# Patient Record
Sex: MALE | Race: WHITE | NOT HISPANIC OR LATINO | Employment: FULL TIME | URBAN - METROPOLITAN AREA
[De-identification: names, ages, dates, MRNs, and addresses within clinical notes are randomized per-mention and may not be internally consistent; named-entity substitution may affect disease eponyms.]

---

## 2019-11-21 ENCOUNTER — DOCUMENTATION ONLY (OUTPATIENT)
Dept: CARE COORDINATION | Facility: CLINIC | Age: 54
End: 2019-11-21

## 2019-11-21 NOTE — TELEPHONE ENCOUNTER
DIAGNOSIS: Lt Knee Pain, Pt requesting a PRP Injection, No Imaging, Surgery in 2015 or 2016 at [facility], Per Pt   APPOINTMENT DATE: 12.2.19   NOTES STATUS DETAILS   OFFICE NOTE from referring provider N/A    OFFICE NOTE from other specialist Care Everywhere 10.28.19 Dr. Cr Brooks  2.7.19 Dr. Cr Brooks  7.26.18 Dr. Cr Brooks  More in EPIC   DISCHARGE SUMMARY from hospital N/A    DISCHARGE REPORT from the ER N/A    OPERATIVE REPORT In process    MEDICATION LIST Care Everywhere    IMPLANT RECORD/STICKER N/A    LABS     CBC/DIFF Care Everywhere 12.5.08   CULTURES N/A    INJECTIONS DONE IN RADIOLOGY N/A    MRI recieved 7.26.18 [facility]   CT SCAN N/A    XRAYS (IMAGES & REPORTS) recieved 7.26.18 [facility]   TUMOR     PATHOLOGY  Slides & report N/A      11.21.19 MJ 12:11 PM  Request images from [facility]. Requested all op notes, office notes, related imaging and reports from [facility].    12/10/19 RH 3pm  Called [facility] to follow up on records and imaging they stated an JESSE is needed for records  Left vm for patient

## 2019-12-09 ENCOUNTER — HEALTH MAINTENANCE LETTER (OUTPATIENT)
Age: 54
End: 2019-12-09

## 2019-12-11 NOTE — TELEPHONE ENCOUNTER
Called and LVM on home phone, unable to leave one on cell phone due to mailbox not being set up. Let him know  would like to see him before PRP, also informed he would need to prepay $500 before the appointment. Gave him our number if he questions.

## 2019-12-12 ENCOUNTER — OFFICE VISIT (OUTPATIENT)
Dept: ORTHOPEDICS | Facility: CLINIC | Age: 54
End: 2019-12-12
Payer: COMMERCIAL

## 2019-12-12 ENCOUNTER — PRE VISIT (OUTPATIENT)
Dept: ORTHOPEDICS | Facility: CLINIC | Age: 54
End: 2019-12-12

## 2019-12-12 VITALS — HEIGHT: 70 IN | WEIGHT: 191 LBS | RESPIRATION RATE: 16 BRPM | BODY MASS INDEX: 27.35 KG/M2

## 2019-12-12 DIAGNOSIS — M17.12 LOCALIZED OSTEOARTHRITIS OF LEFT KNEE: Primary | ICD-10-CM

## 2019-12-12 ASSESSMENT — MIFFLIN-ST. JEOR: SCORE: 1722.62

## 2019-12-12 NOTE — LETTER
Date:December 13, 2019      Patient was self referred, no letter generated. Do not send.        [facility] Information

## 2019-12-12 NOTE — LETTER
"  12/12/2019      RE: Bridger Corona  [address] [facility] [address]       Sports Medicine Clinic Visit:     Subjective:   Bridger Corona is a 54 year old male who is seen in consultation at the request of  for  left knee pain that started  3 years ago.  He and Dr. Hankins had discussed intraarticular PRP as another possibility for managing the sx of OA.  Viscosupplement injection in the past did not help sx.  He is concerned about repeated corticosteroid injections and the effects on the knee over time.  He is able to continue skating but running is uncomfortable and he has been avoiding running.    Onset:Chronic  Mechanism of Injury: NA  Location of Pain: left kne  Duration of Pain: 3 years  Pain is better with: CSI  Pain is worse with: Running, stairs  Treatment so far consists of: CSI, therapy, surgery     Patients past medical, surgical, social and family histories reviewed.  Past medical history notable for: reviewed    REVIEW OF SYSTEMS:  CONSTITUTIONAL:NEGATIVE for fever, chills, change in weight  MUSCULOSKELETAL:See HPI above    Resp 16   Ht 1.778 m (5' 10\")   Wt 86.6 kg (191 lb)   BMI 27.41 kg/m       EXAM:  GENERAL APPEARANCE: healthy, alert and no distress   GAIT: NORMAL  PSYCH:  mentation appears normal and affect normal/bright  Imaging reviewed.    ASSESSMENT: Left knee OA, s/p partial meniscectomy 2016 (?)    PLAN: Discussed the PRP intraarticular injection procedure and the science, evidence, and cost of the procedure.  I discussed the efficacy as reported by systematic reviews in comparison to other injection procedures.  Virtually all of the 25 minutes were spent in counseling, education, and answering question about the procedure.  In response to his request, I provided written articles reviewing PRP, both scientific and lay literature      Mariano Melara MD    "

## 2020-01-09 ENCOUNTER — OFFICE VISIT (OUTPATIENT)
Dept: ORTHOPEDICS | Facility: CLINIC | Age: 55
End: 2020-01-09
Payer: COMMERCIAL

## 2020-01-09 DIAGNOSIS — M17.12 LOCALIZED OSTEOARTHRITIS OF LEFT KNEE: Primary | ICD-10-CM

## 2020-01-09 RX ORDER — LIDOCAINE HYDROCHLORIDE 10 MG/ML
5 INJECTION, SOLUTION EPIDURAL; INFILTRATION; INTRACAUDAL; PERINEURAL
Status: SHIPPED | OUTPATIENT
Start: 2020-01-09

## 2020-01-09 RX ADMIN — LIDOCAINE HYDROCHLORIDE 5 ML: 10 INJECTION, SOLUTION EPIDURAL; INFILTRATION; INTRACAUDAL; PERINEURAL at 10:49

## 2020-01-09 NOTE — NURSING NOTE
[address] [address]  Dept: [phone]  ______________________________________________________________________________    Patient: Bridger Corona   : 1965   MRN: [id number]   2020    INVASIVE PROCEDURE SAFETY CHECKLIST    Date: 20   Procedure: Left knee PRP Injection under ultrasound guidance  Patient Name: Bridger Corona  MRN: [id number]  YOB: 1965    Action: Complete sections as appropriate. Any discrepancy results in a HARD COPY until resolved.     PRE PROCEDURE:  Patient ID verified with 2 identifiers (name and  or MRN): Yes  Procedure and site verified with patient/designee (when able): Yes  Accurate consent documentation in medical record: Yes  H&P (or appropriate assessment) documented in medical record: Yes  H&P must be up to 20 days prior to procedure and updates within 24 hours of procedure as applicable: NA  Relevant diagnostic and radiology test results appropriately labeled and displayed as applicable: Yes  Procedure site(s) marked with provider initials: NA    TIMEOUT:  Time-Out performed immediately prior to starting procedure, including verbal and active participation of all team members addressing the following:Yes  * Correct patient identify  * Confirmed that the correct side and site are marked  * An accurate procedure consent form  * Agreement on the procedure to be done  * Correct patient position  * Relevant images and results are properly labeled and appropriately displayed  * The need to administer antibiotics or fluids for irrigation purposes during the procedure as applicable   * Safety precautions based on patient history or medication use    DURING PROCEDURE: Verification of correct person, site, and procedures any time the responsibility for care of the patient is transferred to another member of the care team.       Prior to injection, verified patient identity using patient's  name and date of birth.  Due to injection administration, patient instructed to remain in clinic for 15 minutes  afterwards, and to report any adverse reaction to me immediately.    Joint injection was performed.      Drug Amount Wasted:  15 of 20mg Lidocaine wasted  Vial/Syringe: Single dose vial  Expiration Date:  6/23      Morena Coultre ATC  January 9, 2020

## 2020-01-09 NOTE — PROGRESS NOTES
PROBLEM:localized osteoarthrosis left knee    SUBJECTIVE: Pt returns for PRP injection for left knee OA    OBJECTIVE: No exam performed    ASSESSMENT: OA left knee    PLAN: US-guided corticosteroid injection left knee    PROCEDURE: Risks, benefits, and cost discussed at prior appointment.  Reviewed again today.  I performed a venipuncture in the right antecubital fossa obtaining 15 mL whole blood without complication.  Wound dressed with band aid.  Blood then centrifuged according to Arthrex protocol.  Approximately 5 mL PRP obtained for intraarticular use.  With pt supine, US used to identify suprapatellar recess via both long and short axis.  After sterile prep using Chlora-Prep, and using sterile technique 5 mL PRP injected into suprapatellar space of left knee with moderate discomfort experienced by patient.  Images and video were recorded demonstrating proper needle placement and position.  He tolerated the procedure well.  Wound dressed with band aid.  After care discussed.  F/U prn        Large Joint Injection/Arthocentesis: L knee joint  Date/Time: 1/9/2020 10:49 AM  Performed by: Mariano Melara MD  Authorized by: Mariano Melara MD     Needle Size:  22 G  Guidance: ultrasound    Approach:  Superolateral  Location:  Knee      Medications:  5 mL lidocaine (PF) 1 %  Medications comment:  PRP Injection    Outcome:  Tolerated well, no immediate complications  Procedure discussed: discussed risks, benefits, and alternatives    Consent Given by:  Patient  Timeout: timeout called immediately prior to procedure    Prep: patient was prepped and draped in usual sterile fashion     Scribed by Morena Coulter ATC for Dr. Melara on 1/9/20 at 9:30AM, based on the provider's statements to me.

## 2020-01-09 NOTE — LETTER
1/9/2020      RE: Bridger Corona  [address] [facility] [address]       PROBLEM:localized osteoarthrosis left knee    SUBJECTIVE: Pt returns for PRP injection for left knee OA    OBJECTIVE: No exam performed    ASSESSMENT: OA left knee    PLAN: US-guided corticosteroid injection left knee    PROCEDURE: Risks, benefits, and cost discussed at prior appointment.  Reviewed again today.  I performed a venipuncture in the right antecubital fossa obtaining 15 mL whole blood without complication.  Wound dressed with band aid.  Blood then centrifuged according to Arthrex protocol.  Approximately 5 mL PRP obtained for intraarticular use.  With pt supine, US used to identify suprapatellar recess via both long and short axis.  After sterile prep using Chlora-Prep, and using sterile technique 5 mL PRP injected into suprapatellar space of left knee with moderate discomfort experienced by patient.  Images and video were recorded demonstrating proper needle placement and position.  He tolerated the procedure well.  Wound dressed with band aid.  After care discussed.  F/U prn        Large Joint Injection/Arthocentesis: L knee joint  Date/Time: 1/9/2020 10:49 AM  Performed by: Mariano Melara MD  Authorized by: Mariano Melara MD     Needle Size:  22 G  Guidance: ultrasound    Approach:  Superolateral  Location:  Knee      Medications:  5 mL lidocaine (PF) 1 %  Medications comment:  PRP Injection    Outcome:  Tolerated well, no immediate complications  Procedure discussed: discussed risks, benefits, and alternatives    Consent Given by:  Patient  Timeout: timeout called immediately prior to procedure    Prep: patient was prepped and draped in usual sterile fashion     Scribed by Morena Coulter ATC for Dr. Melara on 1/9/20 at 9:30AM, based on the provider's statements to me.      Mariano Melara MD

## 2020-02-06 ENCOUNTER — OFFICE VISIT (OUTPATIENT)
Dept: ORTHOPEDICS | Facility: CLINIC | Age: 55
End: 2020-02-06
Payer: COMMERCIAL

## 2020-02-06 VITALS — HEIGHT: 70 IN | WEIGHT: 191 LBS | BODY MASS INDEX: 27.35 KG/M2

## 2020-02-06 DIAGNOSIS — M17.12 LOCALIZED OSTEOARTHRITIS OF LEFT KNEE: Primary | ICD-10-CM

## 2020-02-06 RX ORDER — ROPIVACAINE HYDROCHLORIDE 5 MG/ML
3 INJECTION, SOLUTION EPIDURAL; INFILTRATION; PERINEURAL
Status: SHIPPED | OUTPATIENT
Start: 2020-02-06

## 2020-02-06 RX ORDER — TRIAMCINOLONE ACETONIDE 40 MG/ML
40 INJECTION, SUSPENSION INTRA-ARTICULAR; INTRAMUSCULAR
Status: SHIPPED | OUTPATIENT
Start: 2020-02-06

## 2020-02-06 RX ADMIN — TRIAMCINOLONE ACETONIDE 40 MG: 40 INJECTION, SUSPENSION INTRA-ARTICULAR; INTRAMUSCULAR at 09:35

## 2020-02-06 RX ADMIN — ROPIVACAINE HYDROCHLORIDE 3 ML: 5 INJECTION, SOLUTION EPIDURAL; INFILTRATION; PERINEURAL at 09:35

## 2020-02-06 ASSESSMENT — MIFFLIN-ST. JEOR: SCORE: 1722.62

## 2020-02-06 ASSESSMENT — PAIN SCALES - GENERAL: PAINLEVEL: MILD PAIN (2)

## 2020-02-06 NOTE — PROGRESS NOTES
" Subjective:   Bridger Corona is a 54 year old male who is f/u for left knee pain.  I performed an intraarticular PRP injection 4 weeks ago.  Mr. Corona had a significant soft tissue reaction at the site of injection which has now resolved.  We had several MyChart exchanges and conversations about his sx.  The soft tissue sx at the injection site have resolved. He feels the PRP has provided a small benefit to his OA sx.  He is here to discuss treatment options and his knee pain.  Unable to run but can skate with mild discomfort.    Date of injury: N/A  Date last seen: 1/9/2020  Following Therapeutic Plan: Yes   Pain: Unchanged  Function: Unchanged  Interval History: PRP injection    PAST MEDICAL, SOCIAL, SURGICAL AND FAMILY HISTORY: Patient history was not reviewed.  ALLERGIES: He has No Known Allergies.    CURRENT MEDICATIONS: He has a current medication list which includes the following Facility-Administered Medications: lidocaine (pf).     REVIEW OF SYSTEMS: negative.     Exam:   Ht 1.778 m (5' 10\")   Wt 86.6 kg (191 lb)   BMI 27.41 kg/m             CONSTITUTIONAL: healthy, alert and no distress   GAIT: normal  PSYCHIATRIC: affect normal/bright and mentation appears normal.    MUSCULOSKELETAL:   LEFT KNEE: No effusion, appears symmetric with right knee.  AROM to 100 degrees flexion.  Mild patellar compression pain.  Normal patellar mobility.  Tender along medial joint line.  No lateral joint line tenderness.  Mild crepitus with active flexion and extension.   X-rays reviewed.    ASSESSMENT: Left knee medial compartment OA    PLAN: Discussed all injection treamtment options..  He elects to have a corticosteroid injections today with possibility of another PRP injection in the future.    PROCEDURE:  With pt supine, portal for lateral approach for the suprapatellar space identified and marked.  Site cleansed with Chlora-Prep.  1.0 ml 0.5% ropivicaine injected at site for cutaneous anesthesia.  Using sterile " technique, 1.0 mL Kenalog 40 and 4.0 mL 0.5% riopivicaine were injected into the suprapatellar space of the left knee without complication.  Pt tolerated the procedure well.  Wound dressed with band aid.  Pt will f/u prn for further injections.       Assessment/Plan:   *Large Joint Injection/Arthocentesis: L knee joint  Date/Time: 2/6/2020 9:35 AM  Performed by: Mariano Melara MD  Authorized by: Mariano Melara MD     Indications:  Osteoarthritis  Needle Size:  22 G  Guidance: landmark guided    Approach:  Anterolateral  Location:  Knee      Medications:  40 mg triamcinolone 40 MG/ML; 3 mL ropivacaine 5 MG/ML  Outcome:  Tolerated well, no immediate complications  Procedure discussed: discussed risks, benefits, and alternatives    Consent Given by:  Patient  Timeout: timeout called immediately prior to procedure    Prep: patient was prepped and draped in usual sterile fashion     6 mL of ropivacaine used.  Scribed by Roopa Barbour MS, LAT, ATC for Dr. Melara on 2/6/2020 at 9:35 AM, based on the provider s statements to me.

## 2020-02-06 NOTE — NURSING NOTE
[address] [address]  Dept: [phone]  ______________________________________________________________________________    Patient: Bridger Corona   : 1965   MRN: [id number]   2020    INVASIVE PROCEDURE SAFETY CHECKLIST    Date: 2020   Procedure: Left knee kenalog injection  Patient Name: Bridger Corona  MRN: [id number]  YOB: 1965    Action: Complete sections as appropriate. Any discrepancy results in a HARD COPY until resolved.     PRE PROCEDURE:  Patient ID verified with 2 identifiers (name and  or MRN): Yes  Procedure and site verified with patient/designee (when able): Yes  Accurate consent documentation in medical record: Yes  H&P (or appropriate assessment) documented in medical record: Yes  H&P must be up to 20 days prior to procedure and updates within 24 hours of procedure as applicable: NA  Relevant diagnostic and radiology test results appropriately labeled and displayed as applicable: Yes  Procedure site(s) marked with provider initials: NA    TIMEOUT:  Time-Out performed immediately prior to starting procedure, including verbal and active participation of all team members addressing the following:Yes  * Correct patient identify  * Confirmed that the correct side and site are marked  * An accurate procedure consent form  * Agreement on the procedure to be done  * Correct patient position  * Relevant images and results are properly labeled and appropriately displayed  * The need to administer antibiotics or fluids for irrigation purposes during the procedure as applicable   * Safety precautions based on patient history or medication use    DURING PROCEDURE: Verification of correct person, site, and procedures any time the responsibility for care of the patient is transferred to another member of the care team.       Prior to injection, verified patient identity using patient's name and date of  birth.  Due to injection administration, patient instructed to remain in clinic for 15 minutes  afterwards, and to report any adverse reaction to me immediately.    Joint injection was performed.      Drug Amount Wasted:  Yes: 14 mg/ml ropivacaine  Vial/Syringe: Single dose vial  Expiration Date:  03/22      Roopa Barbour, ATC  February 6, 2020

## 2020-02-06 NOTE — LETTER
"2/6/2020    RE: Bridger Corona  [address] [facility] [address]      Subjective:   Bridger Corona is a 54 year old male who is f/u for left knee pain.  I performed an intraarticular PRP injection 4 weeks ago.  Mr. Corona had a significant soft tissue reaction at the site of injection which has now resolved.  We had several MyChart exchanges and conversations about his sx.  The soft tissue sx at the injection site have resolved. He feels the PRP has provided a small benefit to his OA sx.  He is here to discuss treatment options and his knee pain.  Unable to run but can skate with mild discomfort.    Date of injury: N/A  Date last seen: 1/9/2020  Following Therapeutic Plan: Yes   Pain: Unchanged  Function: Unchanged  Interval History: PRP injection    PAST MEDICAL, SOCIAL, SURGICAL AND FAMILY HISTORY: Patient history was not reviewed.  ALLERGIES: He has No Known Allergies.    CURRENT MEDICATIONS: He has a current medication list which includes the following Facility-Administered Medications: lidocaine (pf).     REVIEW OF SYSTEMS: negative.     Exam:   Ht 1.778 m (5' 10\")   Wt 86.6 kg (191 lb)   BMI 27.41 kg/m              CONSTITUTIONAL: healthy, alert and no distress   GAIT: normal  PSYCHIATRIC: affect normal/bright and mentation appears normal.    MUSCULOSKELETAL:   LEFT KNEE: No effusion, appears symmetric with right knee.  AROM to 100 degrees flexion.  Mild patellar compression pain.  Normal patellar mobility.  Tender along medial joint line.  No lateral joint line tenderness.  Mild crepitus with active flexion and extension.   X-rays reviewed.    ASSESSMENT: Left knee medial compartment OA    PLAN: Discussed all injection treamtment options..  He elects to have a corticosteroid injections today with possibility of another PRP injection in the future.    PROCEDURE:  With pt supine, portal for lateral approach for the suprapatellar space identified and marked.  Site cleansed with Chlora-Prep.  1.0 ml " 0.5% ropivicaine injected at site for cutaneous anesthesia.  Using sterile technique, 1.0 mL Kenalog 40 and 4.0 mL 0.5% riopivicaine were injected into the suprapatellar space of the left knee without complication.  Pt tolerated the procedure well.  Wound dressed with band aid.  Pt will f/u prn for further injections.       Assessment/Plan:   *Large Joint Injection/Arthocentesis: L knee joint  Date/Time: 2/6/2020 9:35 AM  Performed by: Mariano Melara MD  Authorized by: Mariano Melara MD     Indications:  Osteoarthritis  Needle Size:  22 G  Guidance: landmark guided    Approach:  Anterolateral  Location:  Knee      Medications:  40 mg triamcinolone 40 MG/ML; 3 mL ropivacaine 5 MG/ML  Outcome:  Tolerated well, no immediate complications  Procedure discussed: discussed risks, benefits, and alternatives    Consent Given by:  Patient  Timeout: timeout called immediately prior to procedure    Prep: patient was prepped and draped in usual sterile fashion     6 mL of ropivacaine used.  Scribed by Roopa Barbour MS, LAT, ATC for Dr. Melara on 2/6/2020 at 9:35 AM, based on the provider s statements to me.      Mariano Melara MD

## 2020-10-16 NOTE — PROGRESS NOTES
"Sports Medicine Clinic Visit:     Subjective:   Bridger Corona is a 54 year old male who is seen in consultation at the request of  for  left knee pain that started  3 years ago.  He and Dr. Hankins had discussed intraarticular PRP as another possibility for managing the sx of OA.  Viscosupplement injection in the past did not help sx.  He is concerned about repeated corticosteroid injections and the effects on the knee over time.  He is able to continue skating but running is uncomfortable and he has been avoiding running.    Onset:Chronic  Mechanism of Injury: NA  Location of Pain: left kne  Duration of Pain: 3 years  Pain is better with: CSI  Pain is worse with: Running, stairs  Treatment so far consists of: CSI, therapy, surgery     Patients past medical, surgical, social and family histories reviewed.  Past medical history notable for: reviewed    REVIEW OF SYSTEMS:  CONSTITUTIONAL:NEGATIVE for fever, chills, change in weight  MUSCULOSKELETAL:See HPI above    Resp 16   Ht 1.778 m (5' 10\")   Wt 86.6 kg (191 lb)   BMI 27.41 kg/m      EXAM:  GENERAL APPEARANCE: healthy, alert and no distress   GAIT: NORMAL  PSYCH:  mentation appears normal and affect normal/bright  Imaging reviewed.    ASSESSMENT: Left knee OA, s/p partial meniscectomy 2016 (?)    PLAN: Discussed the PRP intraarticular injection procedure and the science, evidence, and cost of the procedure.  I discussed the efficacy as reported by systematic reviews in comparison to other injection procedures.  Virtually all of the 25 minutes were spent in counseling, education, and answering question about the procedure.  In response to his request, I provided written articles reviewing PRP, both scientific and lay literature    " phone number on record out of service

## 2021-01-14 ENCOUNTER — HEALTH MAINTENANCE LETTER (OUTPATIENT)
Age: 56
End: 2021-01-14

## 2021-10-24 ENCOUNTER — HEALTH MAINTENANCE LETTER (OUTPATIENT)
Age: 56
End: 2021-10-24

## 2021-11-11 ENCOUNTER — OFFICE VISIT (OUTPATIENT)
Dept: INTERNAL MEDICINE | Facility: CLINIC | Age: 56
End: 2021-11-11
Payer: COMMERCIAL

## 2021-11-11 VITALS
BODY MASS INDEX: 28.7 KG/M2 | OXYGEN SATURATION: 98 % | HEART RATE: 72 BPM | WEIGHT: 200 LBS | TEMPERATURE: 97.7 F | SYSTOLIC BLOOD PRESSURE: 144 MMHG | DIASTOLIC BLOOD PRESSURE: 98 MMHG

## 2021-11-11 DIAGNOSIS — R21 RASH AND NONSPECIFIC SKIN ERUPTION: ICD-10-CM

## 2021-11-11 DIAGNOSIS — L29.9 ITCHING: Primary | ICD-10-CM

## 2021-11-11 DIAGNOSIS — L98.9 SKIN LESION: ICD-10-CM

## 2021-11-11 DIAGNOSIS — L91.8 SKIN TAG: ICD-10-CM

## 2021-11-11 PROCEDURE — 11200 RMVL SKIN TAGS UP TO&INC 15: CPT | Performed by: NURSE PRACTITIONER

## 2021-11-11 PROCEDURE — 99203 OFFICE O/P NEW LOW 30 MIN: CPT | Mod: 25 | Performed by: NURSE PRACTITIONER

## 2021-11-11 RX ORDER — CEPHALEXIN 500 MG/1
500 CAPSULE ORAL
COMMUNITY
Start: 2021-11-06 | End: 2021-11-16

## 2021-11-11 RX ORDER — TRIAMCINOLONE ACETONIDE 1 MG/G
CREAM TOPICAL 2 TIMES DAILY
Qty: 15 G | Refills: 0 | Status: SHIPPED | OUTPATIENT
Start: 2021-11-11 | End: 2021-11-18

## 2021-11-11 RX ORDER — CETIRIZINE HYDROCHLORIDE 10 MG/1
10 TABLET ORAL
COMMUNITY
Start: 2021-11-06

## 2021-11-11 NOTE — PATIENT INSTRUCTIONS
-Try the triamcinolone cream twice daily to forearms for 1 week  -You can take Benadryl at night to help with night time itching  -Referral to Dermatology for chest skin lesion removal. This looks like a sebhorreic keratosis (occurs with aging)- they can take that off in clinic  -Skin tag removal today

## 2021-11-11 NOTE — PROGRESS NOTES
Assessment & Plan     Itching  Rash and nonspecific skin eruption  - bilateral itchy arms with bumpy, red lesions. Possibly contact dermatitis.  No lesions noted in webs of fingers.  Trial of triamcinolone cream bid x 7 days.  If no improvement, see Dermatology.  Of note, recently seen in UC and treated for LUE cellulitis- this is improving.  Lesions to bilateral forearms does not appear consistent with cellulitis/infection.  - Not finding Zyrtec helpful; can try Benadryl at HS to help reduce itching  - triamcinolone (KENALOG) 0.1 % external cream  Dispense: 15 g; Refill: 0    Skin tag  - Left axilla; 1 larger skin tag below axilla- liquid nitrogen applied  - Left axilla 3 small skin tags; liquid nitrogen applied    Skin lesion  - Central chest- appears consistent with seborrheic keratosis.  Patient desires removal- I'm not comfortable treating this with liquid nitrogen given size/location.  Referral to Dermatology; patient agrees.  - Adult Dermatology Referral      [id number]}     See Patient Instructions    Return if symptoms worsen or fail to improve.    DANIE Mcconnell CNP  [facility]ICo[facility] HospitalLUDA Sparks is a 55 year old who presents for the following health issues     HPI     New patient  Concern - skin check  Onset: x1 month or more  Description: itchy bi-lat forearms-worse at night-skin tags left axillary-mid chest irregular mole  Intensity: mild  Progression of Symptoms:  same and intermittent  Accompanying Signs & Symptoms: skin tags kind of itch  Previous history of similar problem: no  Precipitating factors:        Worsened by:  none  Alleviating factors:        Improved by:  ice packs  Therapies tried and outcome: ice packs-help, allergy meds, lotions,    Normally follows with [facility].  Did not realize he was being seein in Primary Care in order to get an appt with Derm.  Tried to make appt through [facility] with difficulty.      Reports bilateral itchy arms, worse at  "night; scratching a lot; \"bumpy red\".  Was recently seen in UC for arm redness- diagnosed with cellulitis (left upper medial arm) and is currently taking Keflex; reports arm redness is improving, but still has some discomfort.  Taking Zyrtec prescribed at UC visit; was initially helpful, but not anymore.  Itching is worse at night; ice packs help.  He denies any new scents/lotions/soaps.  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  Has not been applying any type of cream to arm.    Has some skin tags left axilla- would like removed as they get caught on things.    Has a skin lesion central chest that he would like to be looked at.  Has been there a while; doesn't really bother him.        Review of Systems   CONSTITUTIONAL:NEGATIVE for fever, chills, change in weight  INTEGUMENTARY/SKIN: POSITIVE for red, bumpy lesions on bilateral forearms, itchy.      Objective    BP (!) 144/98   Pulse 72   Temp 97.7  F (36.5  C) (Oral)   Wt 90.7 kg (200 lb)   SpO2 98%   BMI 28.70 kg/m    Body mass index is 28.7 kg/m .     Physical Exam   GENERAL APPEARANCE: healthy, alert and no distress  RESP: Resp rate regular and easy  SKIN: no suspicious lesions or rashes and red, pruritic, raised scattered bumps bilateral forearms.  Left axilla several skin tags.  Central chest with brown, waxy lesion, raised  PSYCH: mentation appears normal and affect normal/bright          "

## 2022-02-12 ENCOUNTER — HEALTH MAINTENANCE LETTER (OUTPATIENT)
Age: 57
End: 2022-02-12

## 2022-07-22 ENCOUNTER — APPOINTMENT (OUTPATIENT)
Dept: CT IMAGING | Facility: CLINIC | Age: 57
End: 2022-07-22
Attending: EMERGENCY MEDICINE
Payer: COMMERCIAL

## 2022-07-22 ENCOUNTER — HOSPITAL ENCOUNTER (EMERGENCY)
Facility: CLINIC | Age: 57
Discharge: HOME | End: 2022-07-22
Attending: EMERGENCY MEDICINE | Admitting: EMERGENCY MEDICINE
Payer: COMMERCIAL

## 2022-07-22 VITALS
BODY MASS INDEX: 26.48 KG/M2 | SYSTOLIC BLOOD PRESSURE: 140 MMHG | WEIGHT: 185 LBS | HEIGHT: 70 IN | TEMPERATURE: 98 F | HEART RATE: 65 BPM | RESPIRATION RATE: 24 BRPM | OXYGEN SATURATION: 93 % | DIASTOLIC BLOOD PRESSURE: 88 MMHG

## 2022-07-22 DIAGNOSIS — N20.1 URETEROLITHIASIS: ICD-10-CM

## 2022-07-22 LAB
ALBUMIN UR-MCNC: NEGATIVE MG/DL
ANION GAP SERPL CALCULATED.3IONS-SCNC: 6 MMOL/L (ref 3–14)
APPEARANCE UR: CLEAR
BASOPHILS # BLD AUTO: 0.1 10E3/UL (ref 0–0.2)
BASOPHILS NFR BLD AUTO: 1 %
BILIRUB UR QL STRIP: NEGATIVE
BUN SERPL-MCNC: 25 MG/DL (ref 7–30)
CALCIUM SERPL-MCNC: 9.3 MG/DL (ref 8.5–10.1)
CHLORIDE BLD-SCNC: 108 MMOL/L (ref 94–109)
CO2 SERPL-SCNC: 27 MMOL/L (ref 20–32)
COLOR UR AUTO: ABNORMAL
CREAT SERPL-MCNC: 1.18 MG/DL (ref 0.66–1.25)
EOSINOPHIL # BLD AUTO: 0 10E3/UL (ref 0–0.7)
EOSINOPHIL NFR BLD AUTO: 1 %
ERYTHROCYTE [DISTWIDTH] IN BLOOD BY AUTOMATED COUNT: 12.2 % (ref 10–15)
GFR SERPL CREATININE-BSD FRML MDRD: 73 ML/MIN/1.73M2
GLUCOSE BLD-MCNC: 123 MG/DL (ref 70–99)
GLUCOSE UR STRIP-MCNC: NEGATIVE MG/DL
HCT VFR BLD AUTO: 43.5 % (ref 40–53)
HGB BLD-MCNC: 14.4 G/DL (ref 13.3–17.7)
HGB UR QL STRIP: NEGATIVE
IMM GRANULOCYTES # BLD: 0 10E3/UL
IMM GRANULOCYTES NFR BLD: 0 %
KETONES UR STRIP-MCNC: NEGATIVE MG/DL
LEUKOCYTE ESTERASE UR QL STRIP: NEGATIVE
LYMPHOCYTES # BLD AUTO: 0.8 10E3/UL (ref 0.8–5.3)
LYMPHOCYTES NFR BLD AUTO: 9 %
MCH RBC QN AUTO: 29.1 PG (ref 26.5–33)
MCHC RBC AUTO-ENTMCNC: 33.1 G/DL (ref 31.5–36.5)
MCV RBC AUTO: 88 FL (ref 78–100)
MONOCYTES # BLD AUTO: 0.6 10E3/UL (ref 0–1.3)
MONOCYTES NFR BLD AUTO: 7 %
NEUTROPHILS # BLD AUTO: 6.8 10E3/UL (ref 1.6–8.3)
NEUTROPHILS NFR BLD AUTO: 82 %
NITRATE UR QL: NEGATIVE
NRBC # BLD AUTO: 0 10E3/UL
NRBC BLD AUTO-RTO: 0 /100
PH UR STRIP: 7.5 [PH] (ref 5–7)
PLATELET # BLD AUTO: 175 10E3/UL (ref 150–450)
POTASSIUM BLD-SCNC: 3.5 MMOL/L (ref 3.4–5.3)
RBC # BLD AUTO: 4.95 10E6/UL (ref 4.4–5.9)
SODIUM SERPL-SCNC: 141 MMOL/L (ref 133–144)
SP GR UR STRIP: 1.02 (ref 1–1.03)
UROBILINOGEN UR STRIP-MCNC: NORMAL MG/DL
WBC # BLD AUTO: 8.3 10E3/UL (ref 4–11)

## 2022-07-22 PROCEDURE — 36415 COLL VENOUS BLD VENIPUNCTURE: CPT | Performed by: EMERGENCY MEDICINE

## 2022-07-22 PROCEDURE — 96361 HYDRATE IV INFUSION ADD-ON: CPT

## 2022-07-22 PROCEDURE — 96376 TX/PRO/DX INJ SAME DRUG ADON: CPT

## 2022-07-22 PROCEDURE — 250N000011 HC RX IP 250 OP 636: Performed by: EMERGENCY MEDICINE

## 2022-07-22 PROCEDURE — 81003 URINALYSIS AUTO W/O SCOPE: CPT | Performed by: EMERGENCY MEDICINE

## 2022-07-22 PROCEDURE — 96375 TX/PRO/DX INJ NEW DRUG ADDON: CPT

## 2022-07-22 PROCEDURE — 99285 EMERGENCY DEPT VISIT HI MDM: CPT | Mod: 25

## 2022-07-22 PROCEDURE — 85004 AUTOMATED DIFF WBC COUNT: CPT | Performed by: EMERGENCY MEDICINE

## 2022-07-22 PROCEDURE — 74176 CT ABD & PELVIS W/O CONTRAST: CPT

## 2022-07-22 PROCEDURE — 258N000003 HC RX IP 258 OP 636: Performed by: EMERGENCY MEDICINE

## 2022-07-22 PROCEDURE — 96374 THER/PROPH/DIAG INJ IV PUSH: CPT

## 2022-07-22 PROCEDURE — 82310 ASSAY OF CALCIUM: CPT | Performed by: EMERGENCY MEDICINE

## 2022-07-22 RX ORDER — TAMSULOSIN HYDROCHLORIDE 0.4 MG/1
0.4 CAPSULE ORAL DAILY
Qty: 7 CAPSULE | Refills: 0 | Status: SHIPPED | OUTPATIENT
Start: 2022-07-22 | End: 2022-07-29

## 2022-07-22 RX ORDER — ONDANSETRON 2 MG/ML
4 INJECTION INTRAMUSCULAR; INTRAVENOUS ONCE
Status: COMPLETED | OUTPATIENT
Start: 2022-07-22 | End: 2022-07-22

## 2022-07-22 RX ORDER — HYDROCODONE BITARTRATE AND ACETAMINOPHEN 5; 325 MG/1; MG/1
1-2 TABLET ORAL EVERY 6 HOURS PRN
Qty: 14 TABLET | Refills: 0 | Status: SHIPPED | OUTPATIENT
Start: 2022-07-22 | End: 2022-07-25

## 2022-07-22 RX ORDER — ONDANSETRON 4 MG/1
4 TABLET, ORALLY DISINTEGRATING ORAL EVERY 8 HOURS PRN
Qty: 10 TABLET | Refills: 0 | Status: SHIPPED | OUTPATIENT
Start: 2022-07-22 | End: 2022-07-25

## 2022-07-22 RX ORDER — HYDROMORPHONE HYDROCHLORIDE 1 MG/ML
0.5 INJECTION, SOLUTION INTRAMUSCULAR; INTRAVENOUS; SUBCUTANEOUS ONCE
Status: COMPLETED | OUTPATIENT
Start: 2022-07-22 | End: 2022-07-22

## 2022-07-22 RX ORDER — IBUPROFEN 200 MG
400 TABLET ORAL EVERY 8 HOURS PRN
Qty: 30 TABLET | Refills: 0 | Status: SHIPPED | OUTPATIENT
Start: 2022-07-22

## 2022-07-22 RX ORDER — ONDANSETRON 2 MG/ML
4 INJECTION INTRAMUSCULAR; INTRAVENOUS EVERY 30 MIN PRN
Status: DISCONTINUED | OUTPATIENT
Start: 2022-07-22 | End: 2022-07-22 | Stop reason: HOSPADM

## 2022-07-22 RX ORDER — HYDROMORPHONE HYDROCHLORIDE 1 MG/ML
INJECTION, SOLUTION INTRAMUSCULAR; INTRAVENOUS; SUBCUTANEOUS
Status: DISCONTINUED
Start: 2022-07-22 | End: 2022-07-22 | Stop reason: HOSPADM

## 2022-07-22 RX ORDER — KETOROLAC TROMETHAMINE 15 MG/ML
15 INJECTION, SOLUTION INTRAMUSCULAR; INTRAVENOUS ONCE
Status: COMPLETED | OUTPATIENT
Start: 2022-07-22 | End: 2022-07-22

## 2022-07-22 RX ADMIN — HYDROMORPHONE HYDROCHLORIDE 0.5 MG: 1 INJECTION, SOLUTION INTRAMUSCULAR; INTRAVENOUS; SUBCUTANEOUS at 07:05

## 2022-07-22 RX ADMIN — KETOROLAC TROMETHAMINE 15 MG: 15 INJECTION, SOLUTION INTRAMUSCULAR; INTRAVENOUS at 07:04

## 2022-07-22 RX ADMIN — HYDROMORPHONE HYDROCHLORIDE 0.5 MG: 1 INJECTION, SOLUTION INTRAMUSCULAR; INTRAVENOUS; SUBCUTANEOUS at 09:13

## 2022-07-22 RX ADMIN — HYDROMORPHONE HYDROCHLORIDE 0.5 MG: 1 INJECTION, SOLUTION INTRAMUSCULAR; INTRAVENOUS; SUBCUTANEOUS at 07:36

## 2022-07-22 RX ADMIN — ONDANSETRON 4 MG: 2 INJECTION INTRAMUSCULAR; INTRAVENOUS at 08:50

## 2022-07-22 RX ADMIN — SODIUM CHLORIDE 1000 ML: 9 INJECTION, SOLUTION INTRAVENOUS at 07:50

## 2022-07-22 RX ADMIN — ONDANSETRON 4 MG: 2 INJECTION INTRAMUSCULAR; INTRAVENOUS at 07:05

## 2022-07-22 RX ADMIN — SODIUM CHLORIDE 1000 ML: 9 INJECTION, SOLUTION INTRAVENOUS at 07:07

## 2022-07-22 ASSESSMENT — ENCOUNTER SYMPTOMS
CONSTIPATION: 0
SHORTNESS OF BREATH: 0
HEMATURIA: 0
DIARRHEA: 0
NAUSEA: 1
DYSURIA: 0
VOMITING: 0
COUGH: 0

## 2022-07-22 NOTE — ED PROVIDER NOTES
"  History   Chief Complaint:  Flank Pain       The history is provided by the patient.      Bridger Corona is a 56 year old male with history of umbilical hernias who presents with right flank pain that started about an hour ago. His pain does not radiate to his abdomen. He did not take any pain medications before coming to the ED. He states that he is nauseous and almost experienced an episode of emesis. He denies that he is experiencing vomiting, diarrhea, constipation, dysuria, hematuria, shortness of breath, or cough. The patient denies any history of kidney stones. He has a knee replacement surgery scheduled for 3 days from now and is concerned it may delay his procedure.    Review of Systems   Respiratory: Negative for cough and shortness of breath.    Gastrointestinal: Positive for nausea. Negative for constipation, diarrhea and vomiting.   Genitourinary: Negative for dysuria and hematuria.   All other systems reviewed and are negative.    Allergies:  No known allergies    Medications:  Cetirizine    Past Medical History:     Arthritis  Umbilical hernia  Osteoarthritis  GERD  External hemorrhoids    Past Surgical History:    Debride achilles tendon  Knee arthroscopy with meniscectomy x2  Umbilical hernia repair    Family History:    No family history on file.    Social History:  The patient presents to the ED with his wife. He arrived to the ED via car.    Physical Exam     Patient Vitals for the past 24 hrs:   BP Temp Temp src Pulse Resp SpO2 Height Weight   07/22/22 1022 -- -- -- -- -- 93 % -- --   07/22/22 1005 -- -- -- -- -- 93 % -- --   07/22/22 1002 -- -- -- -- -- 92 % -- --   07/22/22 0850 -- -- -- -- -- 95 % -- --   07/22/22 0800 -- -- -- -- -- 97 % -- --   07/22/22 0656 (!) 140/88 -- -- -- -- -- -- --   07/22/22 0649 -- 98  F (36.7  C) Temporal 65 24 99 % 1.778 m (5' 10\") 83.9 kg (185 lb)       Physical Exam  Vitals and nursing note reviewed.   Constitutional:       General: He is in acute distress " (moderate painful distress).      Appearance: Normal appearance. He is not ill-appearing.   HENT:      Head: Normocephalic and atraumatic.      Right Ear: External ear normal.      Left Ear: External ear normal.   Eyes:      Conjunctiva/sclera: Conjunctivae normal.   Cardiovascular:      Rate and Rhythm: Normal rate and regular rhythm.      Heart sounds: No murmur heard.  Pulmonary:      Effort: Pulmonary effort is normal. No respiratory distress.      Breath sounds: No wheezing, rhonchi or rales.   Abdominal:      General: Abdomen is flat. There is no distension.      Palpations: Abdomen is soft.      Tenderness: There is no abdominal tenderness. There is right CVA tenderness. There is no guarding or rebound.   Musculoskeletal:         General: No swelling or deformity.      Cervical back: Normal range of motion and neck supple.   Skin:     General: Skin is warm and dry.      Findings: No rash.   Neurological:      Mental Status: He is alert and oriented to person, place, and time.   Psychiatric:         Behavior: Behavior normal.       Emergency Department Course     Imaging:  CT Abdomen Pelvis w/o Contrast   Final Result   IMPRESSION:    1.  0.4 cm right ureterovesical junction stone. Minimal right   hydronephrosis.   2.  Fatty liver.      ANAHI NATH MD            SYSTEM ID:  [id number]        Report per radiology    Laboratory:  Labs Ordered and Resulted from Time of ED Arrival to Time of ED Departure   UA MACROSCOPIC WITH REFLEX TO MICRO AND CULTURE - Abnormal       Result Value    Color Urine Light Yellow      Appearance Urine Clear      Glucose Urine Negative      Bilirubin Urine Negative      Ketones Urine Negative      Specific Gravity Urine 1.018      Blood Urine Negative      pH Urine 7.5 (*)     Protein Albumin Urine Negative      Urobilinogen Urine Normal      Nitrite Urine Negative      Leukocyte Esterase Urine Negative     BASIC METABOLIC PANEL - Abnormal    Sodium 141      Potassium 3.5      Chloride  108      Carbon Dioxide (CO2) 27      Anion Gap 6      Urea Nitrogen 25      Creatinine 1.18      Calcium 9.3      Glucose 123 (*)     GFR Estimate 73     CBC WITH PLATELETS AND DIFFERENTIAL    WBC Count 8.3      RBC Count 4.95      Hemoglobin 14.4      Hematocrit 43.5      MCV 88      MCH 29.1      MCHC 33.1      RDW 12.2      Platelet Count 175      % Neutrophils 82      % Lymphocytes 9      % Monocytes 7      % Eosinophils 1      % Basophils 1      % Immature Granulocytes 0      NRBCs per 100 WBC 0      Absolute Neutrophils 6.8      Absolute Lymphocytes 0.8      Absolute Monocytes 0.6      Absolute Eosinophils 0.0      Absolute Basophils 0.1      Absolute Immature Granulocytes 0.0      Absolute NRBCs 0.0        Emergency Department Course:     Reviewed:  I reviewed nursing notes, vitals, past medical history and Care Everywhere    Assessments:  0737 I obtained history and examined the patient as noted above.   0944 I rechecked the patient and explained findings.   1032 I rechecked the patient and explained findings.     Interventions:  0704 Ketorolac 15 mg IV  0705 Zofran 4 mg IV  0705 Dilaudid 0.5 mg IV  0707 NS 1 L IV  0736 Dilaudid 0.5 mg IV  0750 NS 1 L IV  0850 Zofran 4 mg IV  0913 Dilaudid 0.5 mg IV    Disposition:  The patient was discharged to home.     Impression & Plan     Medical Decision Makin-year-old male with right flank pain.  Most consistent with ureteral stone.  Other possibilities include pyelonephritis, biliary colic, atypical appendicitis, bowel obstruction.  Will check labs, CT, and treat his pain and nausea.    Diagnosis:    ICD-10-CM    1. Ureterolithiasis  N20.1        Discharge Medications:  Discharge Medication List as of 2022 10:47 AM      START taking these medications    Details   HYDROcodone-acetaminophen (NORCO) 5-325 MG tablet Take 1-2 tablets by mouth every 6 hours as needed for severe pain, Disp-14 tablet, R-0, E-Prescribe      ibuprofen (ADVIL/MOTRIN) 200 MG tablet  Take 2 tablets (400 mg) by mouth every 8 hours as needed for mild pain, Disp-30 tablet, R-0, E-Prescribe      ondansetron (ZOFRAN ODT) 4 MG ODT tab Take 1 tablet (4 mg) by mouth every 8 hours as needed for nausea, Disp-10 tablet, R-0, E-Prescribe      tamsulosin (FLOMAX) 0.4 MG capsule Take 1 capsule (0.4 mg) by mouth daily for 7 doses, Disp-7 capsule, R-0, E-Prescribe             Scribe Disclosure:  I, Car Alicea, am serving as a scribe at 7:37 AM on 7/22/2022 to document services personally performed by Donte Martins MD based on my observations and the provider's statements to me.        Donte Martins MD  07/22/22 [id number]

## 2022-07-22 NOTE — LETTER
July 22, 2022      To Whom It May Concern:      Bridger Corona was seen in our Emergency Department today, 07/22/22.  I expect his condition to improve over the next 1-3 days.  He may return to work when improved.    Sincerely,        Prasanna Drew RN

## 2022-08-29 ENCOUNTER — OFFICE VISIT (OUTPATIENT)
Dept: UROLOGY | Facility: CLINIC | Age: 57
End: 2022-08-29
Payer: COMMERCIAL

## 2022-08-29 VITALS
BODY MASS INDEX: 27.2 KG/M2 | SYSTOLIC BLOOD PRESSURE: 130 MMHG | OXYGEN SATURATION: 97 % | DIASTOLIC BLOOD PRESSURE: 72 MMHG | WEIGHT: 190 LBS | HEART RATE: 71 BPM | HEIGHT: 70 IN

## 2022-08-29 DIAGNOSIS — N20.0 KIDNEY STONE: Primary | ICD-10-CM

## 2022-08-29 LAB
ALBUMIN UR-MCNC: NEGATIVE MG/DL
APPEARANCE UR: CLEAR
BILIRUB UR QL STRIP: NEGATIVE
COLOR UR AUTO: YELLOW
GLUCOSE UR STRIP-MCNC: NEGATIVE MG/DL
HGB UR QL STRIP: NEGATIVE
KETONES UR STRIP-MCNC: NEGATIVE MG/DL
LEUKOCYTE ESTERASE UR QL STRIP: NEGATIVE
NITRATE UR QL: NEGATIVE
PH UR STRIP: 7 [PH] (ref 5–7)
SP GR UR STRIP: 1.01 (ref 1–1.03)
UROBILINOGEN UR STRIP-ACNC: 0.2 E.U./DL

## 2022-08-29 PROCEDURE — 81003 URINALYSIS AUTO W/O SCOPE: CPT | Mod: QW | Performed by: PHYSICIAN ASSISTANT

## 2022-08-29 PROCEDURE — 82365 CALCULUS SPECTROSCOPY: CPT | Mod: 90 | Performed by: PHYSICIAN ASSISTANT

## 2022-08-29 PROCEDURE — 99000 SPECIMEN HANDLING OFFICE-LAB: CPT | Performed by: PHYSICIAN ASSISTANT

## 2022-08-29 PROCEDURE — 99204 OFFICE O/P NEW MOD 45 MIN: CPT | Performed by: PHYSICIAN ASSISTANT

## 2022-08-29 ASSESSMENT — PAIN SCALES - GENERAL: PAINLEVEL: NO PAIN (0)

## 2022-08-29 NOTE — LETTER
8/29/2022       RE: Bridger Corona  [address] [facility] [address]     Dear Colleague,    Thank you for referring your patient, Bridger Corona, to the [facility] at [facility]. Please see a copy of my visit note below.    CC: kidney stone.    HPI: It is a pleasure to see . Bridger Corona, a pleasant 56 year old male seen today in ER follow up for evaluation of the above. This was first detected on CT in the ED on 7/22/22 after the patient presented complaining of acute renal colic symptoms. The CT noted 4mm right distal stone. UA in the ED was negative for infection. BMP revealed Cr and Ca to be normal. With pain under good control, the patient was discharged with a prescription for tamsulosin and instructions to follow up with urology.    Currently, the patient reports capturing stone and feeling back to baseline.  Denies gross hematuria, dysuria, fevers, chills, N/V. No prior history of kidney stones. Adopted.    RECENT IMAGING:  CT ABDOMEN AND PELVIS WITHOUT CONTRAST 7/22/2022 8:16 AM     CLINICAL HISTORY: Flank pain  TECHNIQUE: CT scan of the abdomen and pelvis was performed without IV  contrast. Multiplanar reformats were obtained. Dose reduction  techniques were used.  CONTRAST: None.     COMPARISON: None.     FINDINGS:   LOWER CHEST: Normal.     HEPATOBILIARY: Hepatic steatosis. No acute hepatic or gallbladder  abnormality.     PANCREAS: Normal.     SPLEEN: Granulomatous calcifications. No acute abnormality.     ADRENAL GLANDS: Normal.     KIDNEYS/BLADDER: Right ureterovesical junction stone is 0.4 cm series  3 image 193. Minimal right hydronephrosis. No acute left renal  abnormality.     BOWEL: Normal appendix. No acute bowel abnormality. A few incidental  colonic diverticula.     LYMPH NODES: Normal.     VASCULATURE: Unremarkable.     PELVIC ORGANS: Normal.     OTHER: None.     MUSCULOSKELETAL: Mild spine degenerative  changes.                                                                      IMPRESSION:   1.  0.4 cm right ureterovesical junction stone. Minimal right  hydronephrosis.  2.  Fatty liver.    PMH:  Arthritis  Umbilical hernia  Osteoarthritis  GERD  External hemorrhoids  Nephrolithiasis    SURG:  Debride achilles tendon  Knee arthroscopy with meniscectomy x2  Umbilical hernia repair    Current Outpatient Medications   Medication Sig Dispense Refill     cetirizine (ZYRTEC) 10 MG tablet Take 10 mg by mouth       ibuprofen (ADVIL/MOTRIN) 200 MG tablet Take 2 tablets (400 mg) by mouth every 8 hours as needed for mild pain 30 tablet 0       No Known Allergies    FAMILY HISTORY: There is no family h/o  malignancy.  There is no family h/o urolithiasis.     Social History     Socioeconomic History     Marital status:      Spouse name: Not on file     Number of children: Not on file     Years of education: Not on file     Highest education level: Not on file   Occupational History     Not on file   Tobacco Use     Smoking status: Former Smoker     Smokeless tobacco: Never Used   Substance and Sexual Activity     Alcohol use: Not on file     Drug use: Not on file     Sexual activity: Not on file   Other Topics Concern     Not on file   Social History Narrative     Not on file     Social Determinants of Health     Financial Resource Strain: Not on file   Food Insecurity: Not on file   Transportation Needs: Not on file   Physical Activity: Not on file   Stress: Not on file   Social Connections: Not on file   Intimate Partner Violence: Not on file   Housing Stability: Not on file         GENERAL PHYSICAL EXAM:   There were no vitals taken for this visit.  PSYCH: NAD  NEURO: AAO x3    UA  neg.      ASSESSMENT AND PLAN: 56 year old male with a captured right-sided calculus   - Stone analysis  - Warning signs discussed including fevers, chills, N/V, gross hematuria, severe pain that is refractory to medications which should  prompt more urgent evaluation. Patient understands to proceed to the ER should these warning signs occur outside of clinic hours.    During counseling for this visit, we covered the natural history of kidney stones, the risk of progression to symptomatic pain/infection, and the possibility of renal failure/kidney damage.      Standard recommendations for kidney stone prevention were discussed.  Consider Neph referral for medical management and prevention if any further episodes.        Annie Kelley PA-C  [facility]      26 minutes spent on the date of the encounter doing chart review, review of outside records, review of test results, interpretation of tests, patient visit and documentation and review of CT images.           Again, thank you for allowing me to participate in the care of your patient.      Sincerely,    Annie Kelley PA-C, [facility]

## 2022-08-29 NOTE — PROGRESS NOTES
CC: kidney stone.    HPI: It is a pleasure to see . Bridger Corona, a pleasant 56 year old male seen today in ER follow up for evaluation of the above. This was first detected on CT in the ED on 7/22/22 after the patient presented complaining of acute renal colic symptoms. The CT noted 4mm right distal stone. UA in the ED was negative for infection. BMP revealed Cr and Ca to be normal. With pain under good control, the patient was discharged with a prescription for tamsulosin and instructions to follow up with urology.    Currently, the patient reports capturing stone and feeling back to baseline.  Denies gross hematuria, dysuria, fevers, chills, N/V. No prior history of kidney stones. Adopted.    RECENT IMAGING:  CT ABDOMEN AND PELVIS WITHOUT CONTRAST 7/22/2022 8:16 AM     CLINICAL HISTORY: Flank pain  TECHNIQUE: CT scan of the abdomen and pelvis was performed without IV  contrast. Multiplanar reformats were obtained. Dose reduction  techniques were used.  CONTRAST: None.     COMPARISON: None.     FINDINGS:   LOWER CHEST: Normal.     HEPATOBILIARY: Hepatic steatosis. No acute hepatic or gallbladder  abnormality.     PANCREAS: Normal.     SPLEEN: Granulomatous calcifications. No acute abnormality.     ADRENAL GLANDS: Normal.     KIDNEYS/BLADDER: Right ureterovesical junction stone is 0.4 cm series  3 image 193. Minimal right hydronephrosis. No acute left renal  abnormality.     BOWEL: Normal appendix. No acute bowel abnormality. A few incidental  colonic diverticula.     LYMPH NODES: Normal.     VASCULATURE: Unremarkable.     PELVIC ORGANS: Normal.     OTHER: None.     MUSCULOSKELETAL: Mild spine degenerative changes.                                                                      IMPRESSION:   1.  0.4 cm right ureterovesical junction stone. Minimal right  hydronephrosis.  2.  Fatty liver.    PMH:  Arthritis  Umbilical hernia  Osteoarthritis  GERD  External hemorrhoids  Nephrolithiasis    SURG:  Debride  achilles tendon  Knee arthroscopy with meniscectomy x2  Umbilical hernia repair    Current Outpatient Medications   Medication Sig Dispense Refill     cetirizine (ZYRTEC) 10 MG tablet Take 10 mg by mouth       ibuprofen (ADVIL/MOTRIN) 200 MG tablet Take 2 tablets (400 mg) by mouth every 8 hours as needed for mild pain 30 tablet 0       No Known Allergies    FAMILY HISTORY: There is no family h/o  malignancy.  There is no family h/o urolithiasis.     Social History     Socioeconomic History     Marital status:      Spouse name: Not on file     Number of children: Not on file     Years of education: Not on file     Highest education level: Not on file   Occupational History     Not on file   Tobacco Use     Smoking status: Former Smoker     Smokeless tobacco: Never Used   Substance and Sexual Activity     Alcohol use: Not on file     Drug use: Not on file     Sexual activity: Not on file   Other Topics Concern     Not on file   Social History Narrative     Not on file     Social Determinants of Health     Financial Resource Strain: Not on file   Food Insecurity: Not on file   Transportation Needs: Not on file   Physical Activity: Not on file   Stress: Not on file   Social Connections: Not on file   Intimate Partner Violence: Not on file   Housing Stability: Not on file         GENERAL PHYSICAL EXAM:   There were no vitals taken for this visit.  PSYCH: NAD  NEURO: AAO x3    UA  neg.      ASSESSMENT AND PLAN: 56 year old male with a captured right-sided calculus   - Stone analysis  - Warning signs discussed including fevers, chills, N/V, gross hematuria, severe pain that is refractory to medications which should prompt more urgent evaluation. Patient understands to proceed to the ER should these warning signs occur outside of clinic hours.    During counseling for this visit, we covered the natural history of kidney stones, the risk of progression to symptomatic pain/infection, and the possibility of renal  failure/kidney damage.      Standard recommendations for kidney stone prevention were discussed.  Consider Neph referral for medical management and prevention if any further episodes.        Annie Kelley PA-C  [facility]      26 minutes spent on the date of the encounter doing chart review, review of outside records, review of test results, interpretation of tests, patient visit and documentation and review of CT images.

## 2022-08-29 NOTE — PATIENT INSTRUCTIONS
Standard recommendations for kidney stone prevention:  -These include maintaining fluid intake of 3 liters per day or more with a goal of making 2 or more liters of urine per day.  If alcoholic or caffeinated beverages are consumed, you need to drink water along with these beverages to maintain hydration.    -A few ounces of lemon juice concentrate a day diluted in water can help prevent stones (citrate is a stone inhibitor).    -Sodium influences calcium excretion in the urine. Limit intake of red meat, salt, and salty processed foods.    -Uric acid-high levels in the blood can lead to kidney stones and gout, especially if the urine pH is low.  Reduce protein intake, especially red meats.  -Weight loss, if overweight, can reduce the recurrence of kidney stones.  -Maintain calcium intake in diet through continued consumption of dairy products etc.      -Additional/different recommendations pending any stone analysis.

## 2022-08-29 NOTE — LETTER
Date:August 30, 2022      Patient was self referred, no letter generated. Do not send.        [facility] Information

## 2022-09-02 LAB
APPEARANCE STONE: NORMAL
COMPN STONE: NORMAL
SPECIMEN WT: 11 MG

## 2022-10-10 ENCOUNTER — HEALTH MAINTENANCE LETTER (OUTPATIENT)
Age: 57
End: 2022-10-10

## 2022-11-27 ENCOUNTER — HEALTH MAINTENANCE LETTER (OUTPATIENT)
Age: 57
End: 2022-11-27

## 2024-01-07 ENCOUNTER — HEALTH MAINTENANCE LETTER (OUTPATIENT)
Age: 59
End: 2024-01-07

## 2025-01-25 ENCOUNTER — HEALTH MAINTENANCE LETTER (OUTPATIENT)
Age: 60
End: 2025-01-25

## (undated) RX ORDER — TRIAMCINOLONE ACETONIDE 40 MG/ML
INJECTION, SUSPENSION INTRA-ARTICULAR; INTRAMUSCULAR
Status: DISPENSED
Start: 2020-02-06

## (undated) RX ORDER — LIDOCAINE HYDROCHLORIDE 10 MG/ML
INJECTION, SOLUTION INFILTRATION; PERINEURAL
Status: DISPENSED
Start: 2020-01-09

## (undated) RX ORDER — ROPIVACAINE HYDROCHLORIDE 5 MG/ML
INJECTION, SOLUTION EPIDURAL; INFILTRATION; PERINEURAL
Status: DISPENSED
Start: 2020-02-06